# Patient Record
Sex: MALE | Race: BLACK OR AFRICAN AMERICAN | Employment: UNEMPLOYED | URBAN - METROPOLITAN AREA
[De-identification: names, ages, dates, MRNs, and addresses within clinical notes are randomized per-mention and may not be internally consistent; named-entity substitution may affect disease eponyms.]

---

## 2022-06-29 RX ORDER — BACLOFEN 20 MG/1
TABLET ORAL
Qty: 90 TABLET | OUTPATIENT
Start: 2022-06-29

## 2022-06-29 RX ORDER — AMLODIPINE BESYLATE 10 MG/1
TABLET ORAL
Qty: 30 TABLET | OUTPATIENT
Start: 2022-06-29

## 2022-06-29 RX ORDER — LEVETIRACETAM 1000 MG/1
TABLET ORAL
Qty: 60 TABLET | OUTPATIENT
Start: 2022-06-29

## 2022-06-29 RX ORDER — ATORVASTATIN CALCIUM 40 MG/1
TABLET, FILM COATED ORAL
Qty: 30 TABLET | OUTPATIENT
Start: 2022-06-29

## 2022-11-16 ENCOUNTER — TELEPHONE (OUTPATIENT)
Dept: NEUROLOGY | Age: 52
End: 2022-11-16

## 2023-05-08 ENCOUNTER — HOSPITAL ENCOUNTER (EMERGENCY)
Facility: HOSPITAL | Age: 53
Discharge: HOME | End: 2023-05-09
Attending: STUDENT IN AN ORGANIZED HEALTH CARE EDUCATION/TRAINING PROGRAM
Payer: MEDICAID

## 2023-05-08 DIAGNOSIS — M79.89 LEG SWELLING: Primary | ICD-10-CM

## 2023-05-08 LAB
BASOPHILS # BLD: 0 K/UL
BASOPHILS NFR BLD: 0 %
DIFFERENTIAL METHOD BLD: ABNORMAL
EOSINOPHIL # BLD: 0.1 K/UL
EOSINOPHIL NFR BLD: 1 %
ERYTHROCYTE [DISTWIDTH] IN BLOOD BY AUTOMATED COUNT: 18.1 % (ref 11.5–14.5)
HCT VFR BLD AUTO: 33.9 % (ref 36.6–50.3)
HGB BLD-MCNC: 10.5 G/DL (ref 12.1–17)
IMM GRANULOCYTES # BLD AUTO: 0 K/UL
IMM GRANULOCYTES NFR BLD AUTO: 0 %
LYMPHOCYTES # BLD: 2.3 K/UL
LYMPHOCYTES NFR BLD: 41 %
MCH RBC QN AUTO: 27.6 PG (ref 26–34)
MCHC RBC AUTO-ENTMCNC: 31 G/DL (ref 30–36.5)
MCV RBC AUTO: 89.2 FL (ref 80–99)
MONOCYTES # BLD: 0.3 K/UL
MONOCYTES NFR BLD: 5 %
NEUTS SEG # BLD: 2.8 K/UL
NEUTS SEG NFR BLD: 53 %
NRBC # BLD: 0 K/UL (ref 0–0.01)
NRBC BLD-RTO: 0 PER 100 WBC
PLATELET # BLD AUTO: 199 K/UL (ref 150–400)
PMV BLD AUTO: 11.8 FL (ref 8.9–12.9)
RBC # BLD AUTO: 3.8 M/UL (ref 4.1–5.7)
RBC MORPH BLD: ABNORMAL
RBC MORPH BLD: ABNORMAL
WBC # BLD AUTO: 5.5 K/UL (ref 4.1–11.1)

## 2023-05-08 PROCEDURE — 99283 EMERGENCY DEPT VISIT LOW MDM: CPT

## 2023-05-08 PROCEDURE — 83880 ASSAY OF NATRIURETIC PEPTIDE: CPT

## 2023-05-08 PROCEDURE — 36415 COLL VENOUS BLD VENIPUNCTURE: CPT

## 2023-05-08 PROCEDURE — 85025 COMPLETE CBC W/AUTO DIFF WBC: CPT

## 2023-05-08 PROCEDURE — 80053 COMPREHEN METABOLIC PANEL: CPT

## 2023-05-08 PROCEDURE — 83735 ASSAY OF MAGNESIUM: CPT

## 2023-05-08 RX ORDER — ATORVASTATIN CALCIUM 40 MG/1
40 TABLET, FILM COATED ORAL NIGHTLY
COMMUNITY
Start: 2023-05-01

## 2023-05-08 RX ORDER — MIRTAZAPINE 15 MG/1
15 TABLET, FILM COATED ORAL NIGHTLY
COMMUNITY
Start: 2023-05-01

## 2023-05-08 RX ORDER — HYDROCHLOROTHIAZIDE 12.5 MG/1
25 TABLET ORAL DAILY
COMMUNITY
Start: 2012-11-09

## 2023-05-08 RX ORDER — AMLODIPINE BESYLATE 10 MG/1
10 TABLET ORAL DAILY
COMMUNITY
Start: 2023-05-01

## 2023-05-08 RX ORDER — TRAZODONE HYDROCHLORIDE 100 MG/1
100 TABLET ORAL
COMMUNITY
Start: 2012-11-09

## 2023-05-08 RX ORDER — POTASSIUM CHLORIDE 20 MEQ/1
20 TABLET, EXTENDED RELEASE ORAL DAILY
COMMUNITY
Start: 2023-05-01

## 2023-05-08 RX ORDER — APIXABAN 5 MG/1
5 TABLET, FILM COATED ORAL 2 TIMES DAILY
COMMUNITY
Start: 2023-05-01

## 2023-05-08 RX ORDER — FUROSEMIDE 40 MG/1
40 TABLET ORAL DAILY
COMMUNITY
Start: 2023-05-01

## 2023-05-08 RX ORDER — BACLOFEN 20 MG/1
TABLET ORAL
COMMUNITY
Start: 2023-05-01

## 2023-05-08 RX ORDER — BUPRENORPHINE HYDROCHLORIDE, NALOXONE HYDROCHLORIDE 8; 2 MG/1; MG/1
FILM, SOLUBLE BUCCAL; SUBLINGUAL
COMMUNITY
Start: 2023-04-24

## 2023-05-08 RX ORDER — LEVETIRACETAM 1000 MG/1
1000 TABLET ORAL 2 TIMES DAILY
COMMUNITY
Start: 2023-05-01

## 2023-05-08 ASSESSMENT — PAIN - FUNCTIONAL ASSESSMENT: PAIN_FUNCTIONAL_ASSESSMENT: 0-10

## 2023-05-08 ASSESSMENT — PAIN SCALES - GENERAL: PAINLEVEL_OUTOF10: 4

## 2023-05-08 ASSESSMENT — LIFESTYLE VARIABLES
HOW OFTEN DO YOU HAVE A DRINK CONTAINING ALCOHOL: MONTHLY OR LESS
HOW MANY STANDARD DRINKS CONTAINING ALCOHOL DO YOU HAVE ON A TYPICAL DAY: 1 OR 2

## 2023-05-09 VITALS
SYSTOLIC BLOOD PRESSURE: 127 MMHG | OXYGEN SATURATION: 98 % | HEART RATE: 65 BPM | WEIGHT: 164 LBS | HEIGHT: 67 IN | RESPIRATION RATE: 18 BRPM | BODY MASS INDEX: 25.74 KG/M2 | DIASTOLIC BLOOD PRESSURE: 73 MMHG | TEMPERATURE: 98.4 F

## 2023-05-09 LAB
ALBUMIN SERPL-MCNC: 3.8 G/DL (ref 3.5–5.2)
ALBUMIN/GLOB SERPL: 1.3 (ref 1.1–2.2)
ALP SERPL-CCNC: 65 U/L (ref 40–129)
ALT SERPL-CCNC: 11 U/L (ref 10–50)
ANION GAP SERPL CALC-SCNC: 8 MMOL/L (ref 5–15)
AST SERPL-CCNC: 11 U/L (ref 10–50)
BILIRUB SERPL-MCNC: <0.2 MG/DL (ref 0.2–1)
BUN SERPL-MCNC: 17 MG/DL (ref 6–20)
BUN/CREAT SERPL: 14 (ref 12–20)
CALCIUM SERPL-MCNC: 8.7 MG/DL (ref 8.6–10)
CHLORIDE SERPL-SCNC: 103 MMOL/L (ref 98–107)
CO2 SERPL-SCNC: 29 MMOL/L (ref 22–29)
CREAT SERPL-MCNC: 1.18 MG/DL (ref 0.7–1.2)
GLOBULIN SER CALC-MCNC: 2.9 G/DL (ref 2–4)
GLUCOSE SERPL-MCNC: 110 MG/DL (ref 65–100)
MAGNESIUM SERPL-MCNC: 2 MG/DL (ref 1.6–2.6)
NT PRO BNP: <36 PG/ML
POTASSIUM SERPL-SCNC: 4.4 MMOL/L (ref 3.5–5.1)
PROT SERPL-MCNC: 6.7 G/DL (ref 6.4–8.3)
SODIUM SERPL-SCNC: 140 MMOL/L (ref 136–145)

## 2023-05-09 ASSESSMENT — ENCOUNTER SYMPTOMS: SHORTNESS OF BREATH: 0

## 2023-05-09 NOTE — ED NOTES
I have reviewed discharge instructions with the patient. Opportunity for questions and clarification was provided. The patient verbalized understanding. Patient discharged out of the ED via [facility] wheelchair bound and in stable condition.         Dani Kramer RN  05/09/23 0120

## 2023-05-09 NOTE — ED PROVIDER NOTES
[facility] EMERGENCY DEPT  EMERGENCY DEPARTMENT ENCOUNTER      Pt Name: Mian Barreto  MRN: [id number]  [place] 1970  Date of evaluation: 5/8/2023  Provider: Vika Carey MD    CHIEF COMPLAINT       Chief Complaint   Patient presents with    Leg Swelling         HISTORY OF PRESENT ILLNESS   50-year-old male with history of multiple sclerosis, seizures, polysubstance abuse presents to the ED via EMS with chief complaint of bilateral lower extremity edema for the past several days. He is on Lasix for peripheral edema but says it has been less effective. He reports compliance. No fevers, chills, chest pain, difficulty breathing, abdominal pain, urinary symptoms, bowel symptoms. He is nonambulatory at baseline due to MS but denies any changes with this. Denies history of heart failure, no recent long trips. He is on Eliquis, he is not sure why, no information found on chart review. The history is provided by the patient and medical records. Review of External Medical Records:     Nursing Notes were reviewed. REVIEW OF SYSTEMS       Review of Systems   Respiratory:  Negative for shortness of breath. Cardiovascular:  Negative for chest pain. Except as noted above the remainder of the review of systems was reviewed and negative. PAST MEDICAL HISTORY     Past Medical History:   Diagnosis Date    MS (multiple sclerosis) ([facility] [address]          SURGICAL HISTORY     History reviewed. No pertinent surgical history. CURRENT MEDICATIONS       Discharge Medication List as of 5/9/2023 12:20 AM        CONTINUE these medications which have NOT CHANGED    Details   !!  Buprenorphine HCl-Naloxone HCl (SUBOXONE SL) Place under the tongueHistorical Med      hydroCHLOROthiazide (HYDRODIURIL) 12.5 MG tablet Take 2 tablets by mouth dailyHistorical Med      traZODone (DESYREL) 100 MG tablet Take 1 tablet by mouthHistorical Med      amLODIPine (NORVASC) 10 MG tablet Take 1 tablet by mouth dailyHistorical

## 2025-06-11 ENCOUNTER — HOSPITAL ENCOUNTER (INPATIENT)
Facility: HOSPITAL | Age: 55
LOS: 1 days | Discharge: HOME | DRG: 756 | End: 2025-06-12
Attending: STUDENT IN AN ORGANIZED HEALTH CARE EDUCATION/TRAINING PROGRAM | Admitting: STUDENT IN AN ORGANIZED HEALTH CARE EDUCATION/TRAINING PROGRAM
Payer: MEDICAID

## 2025-06-11 VITALS
OXYGEN SATURATION: 98 % | BODY MASS INDEX: 28.56 KG/M2 | HEIGHT: 67 IN | HEART RATE: 58 BPM | DIASTOLIC BLOOD PRESSURE: 67 MMHG | SYSTOLIC BLOOD PRESSURE: 106 MMHG | TEMPERATURE: 98.4 F | WEIGHT: 182 LBS | RESPIRATION RATE: 10 BRPM

## 2025-06-11 DIAGNOSIS — F19.10 POLYSUBSTANCE ABUSE (HCC): ICD-10-CM

## 2025-06-11 DIAGNOSIS — T50.902A INTENTIONAL DRUG OVERDOSE, INITIAL ENCOUNTER (HCC): ICD-10-CM

## 2025-06-11 DIAGNOSIS — R45.851 SUICIDAL IDEATION: Primary | ICD-10-CM

## 2025-06-11 LAB
ALBUMIN SERPL-MCNC: 3.6 G/DL (ref 3.5–5)
ALBUMIN/GLOB SERPL: 1.1 (ref 1.1–2.2)
ALP SERPL-CCNC: 84 U/L (ref 45–117)
ALT SERPL-CCNC: 23 U/L (ref 12–78)
AMPHET UR QL SCN: NEGATIVE
ANION GAP SERPL CALC-SCNC: 6 MMOL/L (ref 2–12)
APPEARANCE UR: CLEAR
AST SERPL-CCNC: 12 U/L (ref 15–37)
BACTERIA URNS QL MICRO: NEGATIVE /HPF
BARBITURATES UR QL SCN: NEGATIVE
BASOPHILS # BLD: 0.04 K/UL (ref 0–0.1)
BASOPHILS NFR BLD: 0.7 % (ref 0–1)
BENZODIAZ UR QL: NEGATIVE
BILIRUB SERPL-MCNC: 0.2 MG/DL (ref 0.2–1)
BILIRUB UR QL: NEGATIVE
BUN SERPL-MCNC: 19 MG/DL (ref 6–20)
BUN/CREAT SERPL: 17 (ref 12–20)
CALCIUM SERPL-MCNC: 8.5 MG/DL (ref 8.5–10.1)
CANNABINOIDS UR QL SCN: NEGATIVE
CHLORIDE SERPL-SCNC: 104 MMOL/L (ref 97–108)
CO2 SERPL-SCNC: 29 MMOL/L (ref 21–32)
COCAINE UR QL SCN: POSITIVE
COLOR UR: ABNORMAL
COMMENT:: NORMAL
CREAT SERPL-MCNC: 1.15 MG/DL (ref 0.7–1.3)
DIFFERENTIAL METHOD BLD: ABNORMAL
EOSINOPHIL # BLD: 0.17 K/UL (ref 0–0.4)
EOSINOPHIL NFR BLD: 3.1 % (ref 0–7)
EPITH CASTS URNS QL MICRO: ABNORMAL /LPF
ERYTHROCYTE [DISTWIDTH] IN BLOOD BY AUTOMATED COUNT: 13.2 % (ref 11.5–14.5)
ETHANOL SERPL-MCNC: <10 MG/DL (ref 0–0.08)
GLOBULIN SER CALC-MCNC: 3.3 G/DL (ref 2–4)
GLUCOSE SERPL-MCNC: 101 MG/DL (ref 65–100)
GLUCOSE UR STRIP.AUTO-MCNC: NEGATIVE MG/DL
HCT VFR BLD AUTO: 34.5 % (ref 36.6–50.3)
HGB BLD-MCNC: 11.6 G/DL (ref 12.1–17)
HGB UR QL STRIP: NEGATIVE
IMM GRANULOCYTES # BLD AUTO: 0.01 K/UL (ref 0–0.04)
IMM GRANULOCYTES NFR BLD AUTO: 0.2 % (ref 0–0.5)
KETONES UR QL STRIP.AUTO: ABNORMAL MG/DL
LEUKOCYTE ESTERASE UR QL STRIP.AUTO: ABNORMAL
LYMPHOCYTES # BLD: 1.38 K/UL (ref 0.8–3.5)
LYMPHOCYTES NFR BLD: 25.3 % (ref 12–49)
Lab: ABNORMAL
MCH RBC QN AUTO: 32.3 PG (ref 26–34)
MCHC RBC AUTO-ENTMCNC: 33.6 G/DL (ref 30–36.5)
MCV RBC AUTO: 96.1 FL (ref 80–99)
METHADONE UR QL: NEGATIVE
MONOCYTES # BLD: 0.38 K/UL (ref 0–1)
MONOCYTES NFR BLD: 7 % (ref 5–13)
NEUTS SEG # BLD: 3.47 K/UL (ref 1.8–8)
NEUTS SEG NFR BLD: 63.7 % (ref 32–75)
NITRITE UR QL STRIP.AUTO: NEGATIVE
NRBC # BLD: 0 K/UL (ref 0–0.01)
NRBC BLD-RTO: 0 PER 100 WBC
OPIATES UR QL: POSITIVE
PCP UR QL: NEGATIVE
PH UR STRIP: 5.5 (ref 5–8)
PLATELET # BLD AUTO: 198 K/UL (ref 150–400)
PMV BLD AUTO: 12.1 FL (ref 8.9–12.9)
POTASSIUM SERPL-SCNC: 4.4 MMOL/L (ref 3.5–5.1)
PROT SERPL-MCNC: 6.9 G/DL (ref 6.4–8.2)
PROT UR STRIP-MCNC: NEGATIVE MG/DL
RBC # BLD AUTO: 3.59 M/UL (ref 4.1–5.7)
RBC #/AREA URNS HPF: ABNORMAL /HPF (ref 0–5)
SALICYLATES SERPL-MCNC: 2.9 MG/DL (ref 2.8–20)
SODIUM SERPL-SCNC: 139 MMOL/L (ref 136–145)
SP GR UR REFRACTOMETRY: 1.02
SPECIMEN HOLD: NORMAL
URINE CULTURE IF INDICATED: ABNORMAL
UROBILINOGEN UR QL STRIP.AUTO: 1 EU/DL (ref 0.2–1)
WBC # BLD AUTO: 5.5 K/UL (ref 4.1–11.1)
WBC URNS QL MICRO: ABNORMAL /HPF (ref 0–4)

## 2025-06-11 PROCEDURE — 99285 EMERGENCY DEPT VISIT HI MDM: CPT

## 2025-06-11 PROCEDURE — 80053 COMPREHEN METABOLIC PANEL: CPT

## 2025-06-11 PROCEDURE — 36415 COLL VENOUS BLD VENIPUNCTURE: CPT

## 2025-06-11 PROCEDURE — 82077 ASSAY SPEC XCP UR&BREATH IA: CPT

## 2025-06-11 PROCEDURE — 6370000000 HC RX 637 (ALT 250 FOR IP)

## 2025-06-11 PROCEDURE — 80307 DRUG TEST PRSMV CHEM ANLYZR: CPT

## 2025-06-11 PROCEDURE — 85025 COMPLETE CBC W/AUTO DIFF WBC: CPT

## 2025-06-11 PROCEDURE — 80179 DRUG ASSAY SALICYLATE: CPT

## 2025-06-11 PROCEDURE — 81001 URINALYSIS AUTO W/SCOPE: CPT

## 2025-06-11 RX ORDER — LEVETIRACETAM 500 MG/1
1000 TABLET ORAL ONCE
Status: COMPLETED | OUTPATIENT
Start: 2025-06-11 | End: 2025-06-11

## 2025-06-11 RX ORDER — BUMETANIDE 1 MG/1
2 TABLET ORAL ONCE
Status: COMPLETED | OUTPATIENT
Start: 2025-06-11 | End: 2025-06-11

## 2025-06-11 RX ORDER — DOCUSATE SODIUM 100 MG/1
100 CAPSULE, LIQUID FILLED ORAL DAILY PRN
COMMUNITY

## 2025-06-11 RX ORDER — OXCARBAZEPINE 150 MG/1
150 TABLET, FILM COATED ORAL 2 TIMES DAILY
COMMUNITY

## 2025-06-11 RX ORDER — TOPIRAMATE 50 MG/1
50 TABLET, FILM COATED ORAL 2 TIMES DAILY
COMMUNITY

## 2025-06-11 RX ORDER — POTASSIUM CHLORIDE 750 MG/1
20 TABLET, EXTENDED RELEASE ORAL ONCE
Status: COMPLETED | OUTPATIENT
Start: 2025-06-11 | End: 2025-06-11

## 2025-06-11 RX ORDER — BUMETANIDE 2 MG/1
2 TABLET ORAL 2 TIMES DAILY
COMMUNITY

## 2025-06-11 RX ORDER — DULOXETIN HYDROCHLORIDE 30 MG/1
30 CAPSULE, DELAYED RELEASE ORAL DAILY
COMMUNITY

## 2025-06-11 RX ORDER — BUPRENORPHINE AND NALOXONE 8; 2 MG/1; MG/1
1 FILM, SOLUBLE BUCCAL; SUBLINGUAL 3 TIMES DAILY
COMMUNITY

## 2025-06-11 RX ORDER — OXCARBAZEPINE 150 MG/1
150 TABLET, FILM COATED ORAL ONCE
Status: COMPLETED | OUTPATIENT
Start: 2025-06-11 | End: 2025-06-11

## 2025-06-11 RX ORDER — LOSARTAN POTASSIUM 50 MG/1
50 TABLET ORAL DAILY
COMMUNITY

## 2025-06-11 RX ADMIN — BUMETANIDE 2 MG: 1 TABLET ORAL at 21:18

## 2025-06-11 RX ADMIN — APIXABAN 5 MG: 5 TABLET, FILM COATED ORAL at 21:18

## 2025-06-11 RX ADMIN — OXCARBAZEPINE 150 MG: 150 TABLET, FILM COATED ORAL at 21:31

## 2025-06-11 RX ADMIN — POTASSIUM CHLORIDE 20 MEQ: 750 TABLET, FILM COATED, EXTENDED RELEASE ORAL at 21:18

## 2025-06-11 RX ADMIN — LEVETIRACETAM 1000 MG: 500 TABLET, FILM COATED ORAL at 21:18

## 2025-06-11 ASSESSMENT — PAIN DESCRIPTION - LOCATION: LOCATION: GENERALIZED

## 2025-06-11 ASSESSMENT — PAIN - FUNCTIONAL ASSESSMENT: PAIN_FUNCTIONAL_ASSESSMENT: 0-10

## 2025-06-11 ASSESSMENT — PAIN SCALES - GENERAL: PAINLEVEL_OUTOF10: 9

## 2025-06-11 NOTE — ED NOTES
Pt also reports he takes Keppra for seizures and states he takes 1,000 mg twice daily one does at 0700 and another at 1900. Pt reports he has taken his morning dose already today.

## 2025-06-11 NOTE — VIRTUAL HEALTH
Juan FERRARI [place]  [id number]  1970     Social Work Behavioral Health Crisis Assessment    06/11/25    Chief Complaint: Suicidal attempt    HPI: Patient is a 55 y.o. Black /  male who presents for suicide attempt. Patient presented to the ED on 06/11/25 from home. He intentionally overdosed on Cocaine, Fentanyl, and Heroine today in an attempt to end his life. He is in chronic pain because of MS, feels like he is a burden to his family, and feels like his is \"invisible to everyone\". He does not feel cared about or loved by anyone except his mother. He denies homicidal ideation, hallucinations, and delusions. He has a history of violent behavior but said it was \"years ago\". He has access to knives, which he could use to harm himself, and brought one to the hospital. He is a polysubstance abuser and wishes to become sober. He is not currently in counseling and is not followed by a psychiatrist. He takes psychiatric medications but cannot remember the names of them.    Collateral: Unable to obtain collateral    Past Psychiatric History:  Previous diagnosis/symptoms: Anxiety, Major Depressive Disorder, Polysubstance Abuse, Suicidal Ideation, Suicide Attempt  Previous suicide attempts/self-harm: overdosed today on Cocaine, Fentanyl, and Heroine; overdosed in the past  Inpatient psychiatric hospitalizations: yes  Current outpatient psychiatric provider: Denies  Current therapist: States not in therapy  Previous psychiatric medication trials: Trazodone, patient can't remember the names of other meds  Current psychiatric medications: Suboxone, patient can't remember the names of other meds  Family Psychiatric History: Denies    Sleep Hours: 1 to 2 hours  Sleep concerns:  difficulty falling asleep and staying asleep  Use of sleep medications:  patient can't remember the name of the med    Substance Abuse History:  Tobacco: Endorses    Alcohol: Endorses occasionally  Marijuana: Endorses

## 2025-06-11 NOTE — ED NOTES
Verbal shift change report given to Jeana RN (oncoming nurse) by Lyla RODRIGUES (offgoing nurse). Report included the following information Nurse Handoff Report, ED Encounter Summary, ED SBAR, Neuro Assessment, and Event Log.

## 2025-06-11 NOTE — ED NOTES
Pt changed into blue paper scrubs, condom catheter placed, belongings secured. Pt scanned and belongings scanned by security.

## 2025-06-11 NOTE — ED PROVIDER NOTES
Absolute 0.04   Immature Granulocytes Absolute 0.01   Differential Type AUTOMATED [RT]   2054 Comprehensive Metabolic Panel(!):    Sodium 139   Potassium 4.4   Chloride 104   CARBON DIOXIDE 29   Anion Gap 6   Glucose 101(!)   BUN,BUNPL 19   Creatinine 1.15   Bun/Cre 17   Est, Glom Filt Rate 76   Calcium 8.5   Total Bilirubin 0.2   ALT 23   AST 12(!)   Alkaline Phosphatase 84   Total Protein 6.9   Albumin 3.6   Globulin 3.3   Albumin/Globulin Ratio 1.1 [RT]   2054 Ethanol Lvl: <10 [RT]   2054 Salicyclic Acid: 2.9 [RT]   2111 Medication reconciliation completed by pharmacy.  Medications as ordered.  Other medications held at this time because patient is still somnolent from drug use prior to arrival to the ED.  [RT]   2118 Patient is medically clear for behavioral health admission.  Patient has remained hemodynamically stable in the ED not requiring alcohol or supplemental oxygen secondary to drug use prior to arrival to the ED.  Labs are reassuring.  Med reconciliation completed by pharmacy, night meds are ordered.  Urinalysis and UDS pending, however I do not anticipate any results that would affect his medical clearance for inpatient behavioral admission. [RT]   2131 Ethanol Lvl: <10  Patient signed out to Dr. Mcguire due to my shift ending.  Behavioral health placement is pending.  Patient is stable at this time. [RT]   Thu Jun 12, 2025   1113 Urinalysis with Reflex to Culture(!):    Color, UA YELLOW/STRAW   Appearance CLEAR   Specific Gravity, UA 1.020   pH, Urine 5.5   Protein, UA Negative   Glucose, Ur Negative   Ketones, Urine TRACE(!)   Bilirubin, Urine Negative   Blood, Urine Negative   Urobilinogen 1.0   Nitrite, Urine Negative   Leukocyte Esterase, Urine TRACE(!)   WBC, UA 0-4   RBC, UA 0-5   Epithelial Cells, UA FEW   Bacteria, UA Negative   Urine Culture if Indicated CULTURE NOT INDICATED BY UA RESULT [RT]   1113 Discussed with telepsychiatry who is recommending involuntary admission.  Patient adamant

## 2025-06-11 NOTE — ED NOTES
Pt presents to ED complaining of SI with a plan to overdose on Fentanyl, Heroine, and Cocaine. Pt reports taking all 3 substances about an hour PTA.  Pt told RN upon arrival that he had a knife with him and was compliant to giving RN the knife. Knife has been secured with security. Pt denies any HI or A/V HA at this time. Pt reports he doesn't want to live anymore and reports he is always in pain and doesn't want to live anymore. Pt also reports he wants to be clean from drugs. Pt reports he has been living with his mother. Pt endorses hx of MS and uses a electric wheelchair. Pt is alert and oriented x 4, RR even and unlabored, skin is warm and dry. Assessment completed and pt updated on plan of care.  Call bell in reach.    Emergency Department Nursing Plan of Care  The Nursing Plan of Care is developed from the Nursing assessment and Emergency Department Attending provider initial evaluation.  The plan of care may be reviewed in the “ED Provider note”.      The Plan of Care was developed with the following considerations:  Patient / Family readiness to learn indicated by:Refer to Medical chart in [facility]  Persons(s) to be included in education: Refer to Medical chart in [facility]  Barriers to Learning/Limitations:Normal     Signed    Lyla Ley RN  6/11/2025

## 2025-06-12 PROBLEM — F33.0 MDD (MAJOR DEPRESSIVE DISORDER), RECURRENT EPISODE, MILD: Status: ACTIVE | Noted: 2025-06-12

## 2025-06-12 PROCEDURE — 1100000000 HC RM PRIVATE

## 2025-06-12 PROCEDURE — 6370000000 HC RX 637 (ALT 250 FOR IP)

## 2025-06-12 PROCEDURE — 6370000000 HC RX 637 (ALT 250 FOR IP): Performed by: EMERGENCY MEDICINE

## 2025-06-12 PROCEDURE — 97161 PT EVAL LOW COMPLEX 20 MIN: CPT | Performed by: PHYSICAL THERAPIST

## 2025-06-12 RX ORDER — LEVETIRACETAM 500 MG/1
1000 TABLET ORAL ONCE
Status: COMPLETED | OUTPATIENT
Start: 2025-06-12 | End: 2025-06-12

## 2025-06-12 RX ORDER — METHOCARBAMOL 500 MG/1
500 TABLET, FILM COATED ORAL 3 TIMES DAILY PRN
Status: DISCONTINUED | OUTPATIENT
Start: 2025-06-12 | End: 2025-06-12 | Stop reason: HOSPADM

## 2025-06-12 RX ADMIN — METHOCARBAMOL 500 MG: 500 TABLET ORAL at 10:04

## 2025-06-12 RX ADMIN — LEVETIRACETAM 1000 MG: 500 TABLET, FILM COATED ORAL at 10:02

## 2025-06-12 ASSESSMENT — PAIN SCALES - GENERAL: PAINLEVEL_OUTOF10: 4

## 2025-06-12 ASSESSMENT — PAIN DESCRIPTION - LOCATION: LOCATION: GENERALIZED

## 2025-06-12 NOTE — VIRTUAL HEALTH
Juan Coleman, was evaluated through a synchronous (real-time) audio-video encounter. The patient (and/or guardian if applicable) is aware that this is a billable service, which includes applicable co-pays. This virtual visit was conducted with patient's (and/or legal guardian's) consent. Patient identification was verified, and a caregiver was present when appropriate.  The patient was located at Facility (Appt Department): [facility] EMERGENCY DEPARTMENT  [address] [place] [id number]  Loc: [phone]  The provider was located at Home (City/State): [place]  Confirm you are appropriately licensed, registered, or certified to deliver care in the state where the patient is located as indicated above. If you are not or unsure, please re-schedule the visit: Yes, I confirm.   [place] Consult to Tele-Psych  Consult performed by: Carola Wright APRN - CNP  Consult ordered by: Efe Salazar MD  Reason for consult: suicidal           Total time spent on this encounter: Not billed by time    --JAVED Benson CNP on 6/12/2025 at 11:02 AM    An electronic signature was used to authenticate this note.    Juan Coleman  [id number]  1970     EMERGENCY DEPARTMENT TELEPSYCHIATRY EVALUATION    06/12/25    Chief Complaint:  “I wanted to get clean. I was thinking about hurting myself.”  HPI: Patient is a 55 y.o.  male who presented to ED on 6/11/25 with SI and plan to overdose on Fentanyl, heroin, and cocaine. He had used all three about an hour prior to arrival. According to tele-psych SW note, patient intentionally overdosed on all three substances prior to arrival.  Patient was agreeable to voluntary psychiatric admission on assessment by that SW. He was accepted to [facility]'s BHU. However patient requested to leave today so the tele-psych team was re-consulted.  Patient is belligerent and marginally cooperative with assessment today. He

## 2025-06-12 NOTE — BSMART NOTE
BSMART Team Note     LOS:  10:31     Patient goal(s) for today: take medications as prescribed, make needs known in an appropriate manner,   BSMART team focus/goals: assess needs, provide support and education, coordinate care,     Progress note: Patient was observed resting, easily aroused. Patient was seen face to face. Patient denied current suicidal, homicidal thoughts and hallucinations. Patient acknowledged waiting for placement in the morning but did express to this writer that he did not feel safe leaving hospital at this time. This writer assured him that he will hold for placement and seek treatment from [facility]. Patient was cooperative.      Admission Status: Voluntary  TDO Execution Date and Time: N/A  Commitment Hearing Scheduled: No      Date and Time:  N/A  CSB Updates: N/A    Barriers to Discharge: Mobility  Guns in the home: No   Outpatient provider(s):  none  Insurance info/prescription coverage:  [facility]INAL [employer]     Diagnosis: Major Depressive Disorder    Plan:  Requires PT consult before going to U unit, to determine mobility. .   Consult to Tele-Psych Ordered? No  Evaluated by Tele-Psych? No  Tele-Psych Re-eval completed? No        Participating treatment team members: Keerthi Palomino MA

## 2025-06-12 NOTE — BH NOTE
PMKISHORE Queen will accept this pt for voluntary admission to [facility] general unit U. Pt does not require a 1:1 for behaviors on the unit provided there is no change in behavior between the time of acceptance and time of transfer. Per nursing supervisor, Ale, Pt will need to hold in the ED until morning, when pt can be assessed by PT, for evaluation of mobility and pt ability to perform ADL's, and transfer without assist, or if he will require a 1:1 for mobility/safety. Pt states he is independent with transfer and ADL's. Pt is in wheelchair d/t having MS.

## 2025-06-12 NOTE — BSMART NOTE
Patient would like to hold for an available placement at [facility] at this time.     Per access, [facility] supervisor stated patient needs to be assessed in the morning  re: his transfer and mobility. said he would need a 1:1. Not available at this time on U.      12:59am: Patient has been accepted Dr. Queen. Dr. Hernandez pending PT consult to determine patient mobility. Bed assignment will be given at that time.

## 2025-06-12 NOTE — ED NOTES
Pt provided water at bedside. Pt aware of need for urine and the need for straight cath if patient is unable to pee.

## 2025-06-12 NOTE — PROGRESS NOTES
[facility] Pharmacy Admission Medication History    Information obtained from:patient interview at bedside (patient was somnolent during interview), RX Query  RxQuery data available1:yes    Comments/recommendations:  Per patient, not taking topiramate because he wasn't sure why it was prescribed.   Patient is unsure why he is prescribed Eliquis and reports it was started while he was hospitalized in the past. Per chart review, appears it's well documented as \"for unknown reason.\"     Medication changes (since last review):  Added  Docusate  Losartan  Oxcarbazepine  Potassium chloride   Not Taking  Trazodone  Topiramate  HCTZ  Furosemide  Bumetanide 1 mg (taking 2 mg)   Adjusted  Suboxone        1RxQuery pharmacy benefit data reflects medications filled and processed through the patient's insurance, however                this data does NOT capture whether the medication was picked up or is currently being taken by the patient.         Patient allergies:   Allergies as of 06/11/2025 - Fully Reviewed 06/11/2025   Allergen Reaction Noted    Penicillins Hives 06/11/2025     Prior to Admission Medications   Prescriptions Last Dose Informant   DULoxetine (CYMBALTA) 30 MG extended release capsule 6/10/2025 Self, Outside Pharmacy/PCP   Sig: Take 1 capsule by mouth daily   ELIQUIS 5 MG TABS tablet 6/11/2025 Self, Outside Pharmacy/PCP   Sig: Take 1 tablet by mouth 2 times daily   OXcarbazepine (TRILEPTAL) 150 MG tablet 6/11/2025 Self, Outside Pharmacy/PCP   Sig: Take 1 tablet by mouth 2 times daily   amLODIPine (NORVASC) 10 MG tablet 6/11/2025 Self, Outside Pharmacy/PCP   Sig: Take 1 tablet by mouth daily   atorvastatin (LIPITOR) 40 MG tablet 6/11/2025 Self, Outside Pharmacy/PCP   Sig: Take 1 tablet by mouth nightly at bedtime.   baclofen (LIORESAL) 20 MG tablet Unknown Self, Outside Pharmacy/PCP   Sig: TAKE 1 TABLET BY MOUTH 3 TIMES DAILY AS NEEDED   bumetanide (BUMEX) 2 MG tablet 6/10/2025 Self, Outside Pharmacy/PCP   Sig: Take 1 
Orders received, chart reviewed and patient evaluated by physical therapy. Therapist recommends patient mobilize using manual wheelchair until withdrawal symptoms improve and possible MS flare-ups symptoms can be assessed and treated. Patient is independent with ADLs and bed level mobility with increased time needed. Patient benefits from stand-by assist for sit to stand transfer, but did not require physical assistance. Patient was able to ambulate short distance using rolling walker and contact guard assist, but showed minimal lower extremity motor control and was primarily mobilizing using upper extremity support with walker with swing through lower extremity technique. Patient's fall risk is increased by impulsivity and he will benefit from supervision to modulate this.      Full evaluation to follow.    
Discharge Destination. Phys Ther. 2021 Apr 4;101(4):cdse575. doi: 10.1093/ptj/bitz499. PMID: 53051752.  3. Yong SHEN, Jaycob WOODALL, Jerica S, Karri BOYKIN, Davion ORELLANA. Activity Measure for Post-Acute Care \"6-Clicks\" Basic Mobility Scores Predict Discharge Destination After Acute Care Hospitalization in Select Patient Groups: A Retrospective, Observational Study. Arch Rehabil Res Clin Transl. 2022 Jul 16;4(3):508321. doi: 10.1016/j.arrct.2022.148326. PMID: 94156915; PMCID: [id number].  4. Shabana A, Rosemarie S, Reggieer W, Luci P. AM-PAC Short Forms Manual 4.0. Revised 2/2020.                                                                                                                                                                                                                                      Orozco Balance Test:    Orozco Balance Scale  1. Sitting to Standing: Able to stand independently using hands  2. Standing Unsupported: Able to stand 2 minutes with supervision  3. Sitting with Back Unsupported but Feet Supported on Floor or on a Stool: Able to sit 2 minutes under supervision  4. Standing to Sitting: Sits independently but has uncontrolled descent  5.  Transfers: Able to transfer safely definite need of hands  6. Standing Unsupported with Eyes Closed: Able to stand 3 seconds  7. Standing Unsupported with Feet Together: Able to place feet together independently and stand 1 minute with supervision  8. Reach Forward with Outstretched Arm While Standing: Can reach forward 12 cm (5 inches)  9.  Object from Floor from a Standing Position: Unable to  but reaches 2-5 cm (1-2 inches) from slipper and keeps balance independently  10. Turning to Look Behind Over Left and Right Shoulders While Standing: Needs supervision when turning  11. Turn 360 Degrees: Needs assistance while turning  12. Place Alternate Foot on Step or Stool While Standing Unsupported: Needs assistance to keep from falling/unable to try  13. Standing

## 2025-06-12 NOTE — BSMART NOTE
BSMART Team Note     LOS:  18.5 hours    Patient goal(s) for today: Take medications as prescribed, Make needs known in an appropriate manner  BSMART team focus/goals: Assess needs, Provide support and education, Coordinate care    Progress note: Patient is a 55 year old male seen face to face in the ER.  This clinician informed him that he was not going to get a bed at [facility]'s U today, and asked where else he would be willing to go, as this clinician will be doing a bed search.  Patient stated that he just wants to be discharged.  He denied experiencing suicidal or homicidal thoughts.  He denied experiencing symptoms of psychosis.  He has slept while in the ER.  He also has eaten.  He was not willing to discuss going to another hospital's BHU, and is requesting to leave and go home.  Spoke to ER provider who will order another tele-health consult.     Admission Status: Voluntary  TDO Execution Date and Time: n/a  Commitment Hearing Scheduled: No      Date and Time:  n/a  CSB Updates: n/a    Barriers to Discharge: inpatient BHU placement  Outpatient provider(s):  none identified  Insurance info/prescription coverage:  [employer] Medicaid    Diagnosis: Major Depressive Disorder    Plan:  Patient is requesting to be discharged.  ER provider to place a follow up tele-psych evaluation.   Consult to Tele-Psych Ordered? Yes  Evaluated by Tele-Psych? Yes  Tele-Psych Re-eval completed? No (ordered)    Participating treatment team members: Juan Coleman; Elva Mock MA

## 2025-06-12 NOTE — ED NOTES
This RN assisted patient with changing his clothes, as the condom cath was not on properly. Pt has a new condom cath placed, it changed into clean paper scrubs and in clean sheets.

## 2025-06-12 NOTE — BSMART NOTE
Clinician notified [facility] that patient has eloped.  They advised to contact [place] police, which ER staff is currently doing.  They are not able to petition for an ECO since his current location is unknown.

## 2025-06-12 NOTE — ED NOTES
ED EKG interpretation:  Billable EKG reviewed by ED Physician in the absence of a cardiologist: Yes  Rhythm: normal sinus rhythm; and regular . Rate (approx.): 67; Axis: normal; P wave: normal; QRS interval: normal ; ST/T wave: normal; Other findings: normal. This EKG was independently interpreted by Juan José Bueno M.D.        Juan José Bueno MD  06/11/25 1574

## 2025-06-12 NOTE — ED NOTES
Verbal shift change report given to Lyla RODRIGUES (oncoming nurse) by Jeana RODRIGUES (offgoing nurse). Report included the following information Nurse Handoff Report, ED Encounter Summary, ED SBAR, Adult Overview, MAR, Recent Results, Neuro Assessment, and Event Log.

## 2025-06-12 NOTE — BSMART NOTE
Charge RN reported that tele-health is still recommending admission and patient wants to leave.  This clinician contacted [facility] to request a TDO prescreening per tele-health's recommendation.